# Patient Record
Sex: MALE | Employment: FULL TIME | URBAN - METROPOLITAN AREA
[De-identification: names, ages, dates, MRNs, and addresses within clinical notes are randomized per-mention and may not be internally consistent; named-entity substitution may affect disease eponyms.]

---

## 2021-10-27 ENCOUNTER — TELEPHONE (OUTPATIENT)
Dept: OTHER | Age: 26
End: 2021-10-27

## 2021-10-27 NOTE — TELEPHONE ENCOUNTER
Pt name and  verified. Who is your current or most recent primary care provider and   which hospital were they affiliated with? Hasn't had one since he was 17    How long did you see them for? n/a    Is there a reason that you are looking for a new provider? Needs PCP    Would you like to provide your e-mail to sign up for out My Chart system? Yes  The benefits of being a My Chart member are:    -Request medical appointments  -View your health summary from the My Chart electronic health record. -View test results.  -Request prescription renewals.  -Access [facility] health information resources.  -Communicate electronically and securely with your medical care team.    Would you like a test message or letter with your activation code? Text    Do you have a preference about the provider you will see? No    When was your last Annual or Well Child Exam? Has had job physicals but not with a PCP    Do you have any ongoing or chronic conditions you are currently being treated   for (e.g. Hypertension, Diabetes, Depression, etc)? If yes list: No    Have you been seen by a provider for these chronic conditions in the last 6 months? If so, who? n/a    Do you need an appointment to establish care or is there something more   urgent you need to be seen for?  Explain: Establish / Annual    Are you on any medicines that require a special prescription such as a sleeping pills, pain medication or stimulants? (if no go ahead and book appointment, if yes please read the following: No    \"Please be aware that our provider's may not prescribe these medications at the first visit as they need to get to know more about you and your medical problems/history before they will safely prescribe these\" Inforemd? Yes or No n/a    Do you have any questions about what we discussed? no  (If no go ahead and book, if yes please get the details and send to the practice)    Your new patient appointment is booked on 21 at 2:45 with [facility] Jason REYES. **Thank you for choosing us for your health care needs. Mr. Brooke Renteria do want to let you know that you will need to contact your insurance company and make them aware of your new Primary Care Provider which is   Also Mr. Dmitri Luna a new patient packet will be coming to you. Would you like to have this mailed or faxed to you? mailed If faxed what is the number? mailed  We would greatly appreciate it if you could complete the release of records and either mail back or drop off at out office within 7 days of receipt.     #: [phone] (home)      [address] [facility]

## 2022-01-22 PROBLEM — Z76.89 ENCOUNTER TO ESTABLISH CARE: Status: ACTIVE | Noted: 2022-01-22
